# Patient Record
Sex: MALE | Race: WHITE | NOT HISPANIC OR LATINO | URBAN - METROPOLITAN AREA
[De-identification: names, ages, dates, MRNs, and addresses within clinical notes are randomized per-mention and may not be internally consistent; named-entity substitution may affect disease eponyms.]

---

## 2017-08-31 ENCOUNTER — OFFICE VISIT - HEALTHEAST (OUTPATIENT)
Dept: FAMILY MEDICINE | Facility: CLINIC | Age: 42
End: 2017-08-31

## 2017-08-31 DIAGNOSIS — B35.4 RINGWORM OF BODY: ICD-10-CM

## 2017-08-31 DIAGNOSIS — L02.92 BOIL: ICD-10-CM

## 2017-09-06 ENCOUNTER — RECORDS - HEALTHEAST (OUTPATIENT)
Dept: ADMINISTRATIVE | Facility: OTHER | Age: 42
End: 2017-09-06

## 2017-09-19 ENCOUNTER — OFFICE VISIT - HEALTHEAST (OUTPATIENT)
Dept: FAMILY MEDICINE | Facility: CLINIC | Age: 42
End: 2017-09-19

## 2017-09-19 DIAGNOSIS — M25.561 ACUTE PAIN OF RIGHT KNEE: ICD-10-CM

## 2017-09-19 DIAGNOSIS — L02.92 FURUNCLE: ICD-10-CM

## 2017-09-19 ASSESSMENT — MIFFLIN-ST. JEOR: SCORE: 1673.71

## 2018-02-06 ENCOUNTER — OFFICE VISIT - HEALTHEAST (OUTPATIENT)
Dept: FAMILY MEDICINE | Facility: CLINIC | Age: 43
End: 2018-02-06

## 2018-02-06 DIAGNOSIS — L03.012 FELON OF FINGER OF LEFT HAND: ICD-10-CM

## 2018-02-07 ENCOUNTER — OFFICE VISIT - HEALTHEAST (OUTPATIENT)
Dept: FAMILY MEDICINE | Facility: CLINIC | Age: 43
End: 2018-02-07

## 2018-02-07 ENCOUNTER — COMMUNICATION - HEALTHEAST (OUTPATIENT)
Dept: SCHEDULING | Facility: CLINIC | Age: 43
End: 2018-02-07

## 2018-02-07 DIAGNOSIS — R11.0 NAUSEA: ICD-10-CM

## 2018-02-07 ASSESSMENT — MIFFLIN-ST. JEOR: SCORE: 1719.07

## 2018-02-10 LAB — BACTERIA SPEC CULT: ABNORMAL

## 2018-05-17 ENCOUNTER — OFFICE VISIT - HEALTHEAST (OUTPATIENT)
Dept: FAMILY MEDICINE | Facility: CLINIC | Age: 43
End: 2018-05-17

## 2018-05-17 DIAGNOSIS — R59.9 LYMPH NODE ENLARGEMENT: ICD-10-CM

## 2018-05-17 DIAGNOSIS — R03.0 BLOOD PRESSURE ELEVATED WITHOUT HISTORY OF HTN: ICD-10-CM

## 2018-05-17 DIAGNOSIS — R22.1 SUBMENTAL MASS: ICD-10-CM

## 2018-05-17 ASSESSMENT — MIFFLIN-ST. JEOR: SCORE: 1705.46

## 2018-06-06 ENCOUNTER — OFFICE VISIT - HEALTHEAST (OUTPATIENT)
Dept: FAMILY MEDICINE | Facility: CLINIC | Age: 43
End: 2018-06-06

## 2018-06-06 DIAGNOSIS — L02.612 ABSCESS OF TOE OF LEFT FOOT: ICD-10-CM

## 2018-06-06 ASSESSMENT — MIFFLIN-ST. JEOR: SCORE: 1714.53

## 2018-07-30 ENCOUNTER — OFFICE VISIT - HEALTHEAST (OUTPATIENT)
Dept: FAMILY MEDICINE | Facility: CLINIC | Age: 43
End: 2018-07-30

## 2018-07-30 DIAGNOSIS — T23.111A SUPERFICIAL BURN OF RIGHT THUMB, INITIAL ENCOUNTER: ICD-10-CM

## 2018-07-31 ENCOUNTER — RECORDS - HEALTHEAST (OUTPATIENT)
Dept: ADMINISTRATIVE | Facility: OTHER | Age: 43
End: 2018-07-31

## 2018-09-07 ENCOUNTER — OFFICE VISIT - HEALTHEAST (OUTPATIENT)
Dept: FAMILY MEDICINE | Facility: CLINIC | Age: 43
End: 2018-09-07

## 2018-09-07 DIAGNOSIS — J02.0 STREP THROAT: ICD-10-CM

## 2018-09-07 LAB — DEPRECATED S PYO AG THROAT QL EIA: ABNORMAL

## 2018-09-07 ASSESSMENT — MIFFLIN-ST. JEOR: SCORE: 1732.68

## 2018-12-10 ENCOUNTER — OFFICE VISIT - HEALTHEAST (OUTPATIENT)
Dept: FAMILY MEDICINE | Facility: CLINIC | Age: 43
End: 2018-12-10

## 2018-12-10 DIAGNOSIS — J40 BRONCHITIS: ICD-10-CM

## 2018-12-10 DIAGNOSIS — J32.9 SINUSITIS, UNSPECIFIED CHRONICITY, UNSPECIFIED LOCATION: ICD-10-CM

## 2018-12-10 ASSESSMENT — MIFFLIN-ST. JEOR: SCORE: 1719.07

## 2019-02-06 ENCOUNTER — COMMUNICATION - HEALTHEAST (OUTPATIENT)
Dept: FAMILY MEDICINE | Facility: CLINIC | Age: 44
End: 2019-02-06

## 2020-03-23 ENCOUNTER — VIRTUAL VISIT (OUTPATIENT)
Dept: FAMILY MEDICINE | Facility: OTHER | Age: 45
End: 2020-03-23

## 2020-03-24 NOTE — PROGRESS NOTES
"Date: 2020 20:13:27  Clinician: Wanda Medina  Clinician NPI: [id number]  Patient: Jovani Griffin  Patient : 1975  Patient Address: [address]  [place] [address]  Patient Phone: [phone]  Visit Protocol: URI  Patient Summary:  Jovani is a 45 year old ( : 1975 ) male who initiated a Visit for COVID-19 (Coronavirus) evaluation and screening. When asked the question \"Please sign me up to receive news, health information and promotions from [employer].\", Jovani responded \"No\".    Jovani states his symptoms started gradually 3-6 days ago. After his symptoms started, they improved and then got worse again.   His symptoms consist of a sore throat, a cough, and a headache.   Symptom details     Cough: Jovani coughs a few times an hour and his cough is more bothersome at night. Phlegm does not come into his throat when he coughs. He does not believe his cough is caused by post-nasal drip.     Sore throat: Jovani reports having moderate throat pain (4-6 on a 10 point pain scale), has exudate on his tonsils, and can swallow liquids. He is not sure if the lymph nodes in his neck are enlarged. A rash has not appeared on the skin since the sore throat started.     Headache: He states the headache is mild (1-3 on a 10 point pain scale).      Jovani denies having ear pain, rhinitis, facial pain or pressure, myalgias, wheezing, nasal congestion, malaise, chills, teeth pain, and fever. He also denies having a sinus infection within the past year, taking antibiotic medication for the symptoms, and having recent facial or sinus surgery in the past 60 days. He is not experiencing dyspnea.   Precipitating events  Within the past week, Jovani has been exposed to someone with strep throat. He has not recently been exposed to someone with influenza. Jovani has not been in close contact with any high risk individuals.   Pertinent COVID-19 (Coronavirus) information  " Jovani has not traveled internationally or to the areas where COVID-19 (Coronavirus) is widespread, including cruise ship travel in the last 14 days before the start of his symptoms.   Jovani has not had a close contact with a laboratory-confirmed COVID-19 patient within 14 days of symptom onset. He also has not had a close contact with a suspected COVID-19 patient within 14 days of symptom onset.   Jovani is not a healthcare worker and does not work in a healthcare facility.   Pertinent medical history  Jovani does not need a return to work/school note.   Weight: 180 lbs   Jovani smokes or uses smokeless tobacco.   Weight: 180 lbs    MEDICATIONS: No current medications, ALLERGIES: NKDA  Clinician Response:  Dear Jovani,   See below    Diagnosis: Streptococcal pharyngitis  Diagnosis ICD: J02.0  Prescription: azithromycin (Zithromax) 250 mg oral tablet 6 tablet, 5 days supply. Take 2 tablets on day 1 then 1 tablet by mouth 1 time per day for 4 days. Refills: 0, Refill as needed: no, Allow substitutions: yes  Pharmacy: [id number] - [phone] - [address], [place] [address]

## 2021-05-31 VITALS — WEIGHT: 174.2 LBS | BODY MASS INDEX: 25.72 KG/M2

## 2021-05-31 VITALS — BODY MASS INDEX: 26.07 KG/M2 | WEIGHT: 176 LBS | HEIGHT: 69 IN

## 2021-06-01 VITALS — WEIGHT: 189 LBS | BODY MASS INDEX: 27.91 KG/M2

## 2021-06-01 VITALS — WEIGHT: 183 LBS | HEIGHT: 69 IN | BODY MASS INDEX: 27.11 KG/M2

## 2021-06-01 VITALS — BODY MASS INDEX: 27.4 KG/M2 | HEIGHT: 69 IN | WEIGHT: 185 LBS

## 2021-06-01 VITALS — WEIGHT: 183.7 LBS | BODY MASS INDEX: 27.13 KG/M2

## 2021-06-01 VITALS — HEIGHT: 69 IN | BODY MASS INDEX: 27.55 KG/M2 | WEIGHT: 186 LBS

## 2021-06-02 VITALS — HEIGHT: 69 IN | WEIGHT: 186 LBS | BODY MASS INDEX: 27.55 KG/M2

## 2021-06-02 VITALS — BODY MASS INDEX: 27.99 KG/M2 | WEIGHT: 189 LBS | HEIGHT: 69 IN

## 2021-06-03 ENCOUNTER — RECORDS - HEALTHEAST (OUTPATIENT)
Dept: ADMINISTRATIVE | Facility: CLINIC | Age: 46
End: 2021-06-03

## 2021-06-12 NOTE — PROGRESS NOTES
Subjective:      Patient ID: Jovani Griffin is a 42 y.o. male.    Chief Complaint:    HPI  Patient comes in for evaluation of swollen gland on the right side of his neck.  He also has a boil on the left submandibular region that was getting better, but now starting to drain and getting smaller.  He denies a sore throat.  No fevers or chills.  Slight cough.  No rhinitis.  No body aches or arthralgias.      Social History   Substance Use Topics     Smoking status: Current Every Day Smoker     Smokeless tobacco: None     Alcohol use None       Review of Systems    Objective:     /74  Pulse 65  Temp 99  F (37.2  C) (Oral)   Resp 20  Wt 174 lb 3.2 oz (79 kg)  SpO2 99%  BMI 25.72 kg/m2    Physical Exam   Constitutional: No distress.   Well appearing   HENT:   Right Ear: External ear normal.   Left Ear: External ear normal.   Mouth/Throat: Oropharynx is clear and moist.   Eyes: Conjunctivae are normal.   Neck: Neck supple.   Mild swelling and tenderness to the right neck with slight tenderness. Small boil along the left submandibular region.   Pulmonary/Chest: Effort normal.   Neurological: He is alert.   Skin: Skin is warm and dry.   There is a lesion on the right antecubital fossa consistent with ringworm.  He then showed me his chest and back and he had widespread tinea corpus on both sides.       Assessment and Plan:     Procedures    The primary encounter diagnosis was Boil. A diagnosis of Ringworm of body was also pertinent to this visit.     Mild swelling on the right side of the throat that appears to be reactive lymph node.  No evidence of ear infection or sinusitis.  No evidence for a tooth infection.    Boil on the left side of the submandibular region.  This area is nontender no signs of infection.  I am considering treating with antibiotics based on his previous history of boils and culture obtained a couple years ago was sensitive to most of the antibiotics.  I chose Septra for this.    Skin  rash that appears to be due to widespread tinea corpus.  He had been treated in the past with Selsun Blue shampoo for a month and he said that did not do anything for it.  I think he would benefit from consultation with a dermatologist and may need oral antifungal treatment.      Medications Ordered   Medications     sulfamethoxazole-trimethoprim (BACTRIM DS) 800-160 mg per tablet     Sig: Take 1 tablet by mouth 2 (two) times a day for 10 days.     Dispense:  20 tablet     Refill:  0

## 2021-06-13 NOTE — PROGRESS NOTES
Assessment: /    Plan:    1. Acute pain of right knee     2. Furuncle         Discontinue climbing 2 stairs at a time.  Use warm or cold on the knee as needed.  Tylenol as needed for pain.  Recheck if not improving.    Hot packs to the furuncle on the chin.    He will think about quitting smoking.    This was a 32 minute visit with >50% of the time spent in counseling and coordination of care regarding: Knee pain, furuncle.      Subjective:    HPI:  Enrique Griffin is a 42-year-old male presenting with right knee pain.  This began 2 days ago.  Pain is worse when he is kneeling or crouching.  He also notes pain when climbing stairs 2 at a time.  He has not had any injury to the knee.    He also notes an ingrown hair below the chin on the left which has been present for 2 weeks.    Social Hx: He smokes 3/4 pack per day.    Family history: His father, brother and paternal grandmother have diabetes.  His paternal grandfather was diagnosed with colon cancer at age 60.    Review of Systems: No redness of the joints, swelling of the knee, fever, drainage from the chin.      Current Outpatient Prescriptions   Medication Sig Dispense Refill     ketoconazole (NIZORAL) 2 % shampoo   11     sulfamethoxazole-trimethoprim (SEPTRA DS) 800-160 mg per tablet Take 1 tablet by mouth 2 (two) times a day.       fluconazole (DIFLUCAN) 150 MG tablet TK 2 TS NOW THEN 2 TS IN 1 WEEK  0     No current facility-administered medications for this visit.          Objective:    Vitals:    09/19/17 1047   BP: 128/86   Pulse:    Resp:    Temp:        Gen:  NAD, VSS  Lungs:  normal  Heart:  normal  Skin with a furuncle below the chin on the left.  Right knee with tenderness inferior and lateral to the patella.  No erythema, effusion or increased warmth.  Full range of motion, normal strength.  No ligament instability.  Ashley's negative.  Left knee is normal.        ADDITIONAL HISTORY SUMMARIZED (2): None.  DECISION TO OBTAIN EXTRA INFORMATION (1):  None.   RADIOLOGY TESTS (1): None.  LABS (1): None.  MEDICINE TESTS (1): None.  INDEPENDENT REVIEW (2 each): None.     Total Data Points: 0

## 2021-06-15 NOTE — PROGRESS NOTES
Subjective:   Jovani comes in today because of an episode of lightheadedness and weakness.  This was associated with nausea and sweating.  This occurred when he was at his work.  His work sent him home.  Upon further questioning the patient had taken pain medications prior to going to work.  He sustained a burn on his left index finger 1 week ago.  He was given antibiotics and pain medications to help treat this.  The pain medication was oxycodone.  He took 2 tablets this morning on an empty stomach.  His symptoms now have dissipated.  He denies nausea or vomiting at this point.  He had no chest pain.  He denies shortness of breath.      Objective:  HEENT: Conjunctiva clear.  Pharynx clear.  Neck: No bruits heard.  No thyromegaly present.  Cardiac: There is a regular rhythm present.  No ectopy heard.  No murmur present.  Lungs: Clear  Abdomen: Abdomen is flat and soft.  No epigastric tenderness.  No masses noted.  Bowel sounds are active throughout.      Assessment:  1.  Nausea associated with diaphoresis most likely secondary to side effect of narcotic medications.  Rule out hypoglycemic episode      Plan:  I instructed patient he should not be taking narcotic medications while driving or working.  He should not operate machinery on this either.  He was given a note to be off of work today.  He can return to work tomorrow with his present restrictions.  He will stay well hydrated.  He will make sure he is eating routinely throughout the day as well.  I do not feel any blood work or workup for patient's symptoms are indicated at this point in time.  He will follow-up here as needed.

## 2021-06-15 NOTE — PROGRESS NOTES
Chief Complaint   Patient presents with     poss burn     x4days burn on index finger,  ashlee Griffin is a 43 y.o. male seen today for swollen, painful left index finger.  On 2/2/2018 he burned the ulnar surface of the distal phalanx of his left index finger.  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  Occasionally that he penetrates his work gloves.  He states this has happened at least once before.  He was seen at the Workmen's Comp. clinic 3 days after the injury and prescribed oxycodone with Tylenol, cephalexin, and silver sulfadiazine cream.  Today his finger is more swollen and painful, throbbing and keeping him awake at night.  Denies fevers or chills.    Current Outpatient Prescriptions   Medication Sig Dispense Refill     fluconazole (DIFLUCAN) 150 MG tablet TK 2 TS NOW THEN 2 TS IN 1 WEEK  0     ketoconazole (NIZORAL) 2 % shampoo   11     sulfamethoxazole-trimethoprim (BACTRIM DS) 800-160 mg per tablet Take 1 tablet by mouth 2 (two) times a day for 10 days. 20 tablet 0     No current facility-administered medications for this visit.         Reviewed and updated: medical history, medications and allergies.     Review of Systems     General: Denies fever, chills, fatigue.  MSK: Pain in the index finger of his left hand.     Objective     Vitals:    02/06/18 1731   BP: 130/80   Pulse: 87   Resp: 15   Temp: 98  F (36.7  C)   TempSrc: Oral   SpO2: 98%   Weight: 189 lb (85.7 kg)        Reviewed vital signs.  General: Appears calm, comfortable. Answers questions quickly and appropriately with clear speech. No apparent distress.  Skin: Pink, warm, dry.  HENT: Normocephalic, atraumatic.  Neck: Supple.  Heart: Strong, regular radial pulse.  Lungs: Normal respiratory effort.  Neuro: Memory and cognition appear normal. Normal gait.  Psych: Mood and affect appear normal.  MSK: Distal phalanx of left index finger is erythematous, with  fluctuant swelling on the palmar surface.  Burned area is along the ulnar surface of the distal phalanx and is producing a small amount of purulent discharge.  The pad of the finger is exquisitely tender and taut.  Erythema does not extend proximal to the DIP.  Exhibits full AROM at the DIP and PIP.  No areas of necrotic tissue noted.     Medical Decision-Making     Jovani is a well-appearing 43-year-old male who presents with fluctuant, swollen, erythematous pad of the left index finger.  He is right-hand dominant.  While a small amount of purulent discharge can be expressed directly from the burn surface, it appears a significant amount remains within the pad of the finger.  Incision and drainage did produce a small amount of thick, purulent discharge which was cultured for staph.  Directed him to obtain prompt follow-up with a Workmen's Comp. physician no later than Thursday of this week.  If he is not able to see them he is to return to the walk-in clinic.  Given the purulent nature of this infection, I continue the cephalexin and ordered Bactrim instead.    Reviewed red flags that would trigger a prompt return to the clinic as noted below under patient instructions.  He expressed understanding of these directions and is in agreement with the plan.       Procedure     After verifying that sensation and circulation are intact at the tip of the index finger, a digital block was performed just distal to the MCP.  Adequate anesthesia was achieved and verified.  The area was cleansed with iodine swabs.  With an 11 blade and a stabbing motion, an incision was made at the area of maximum fluctuance, producing a small amount of purulent discharge.  A second stabbing incision was made along the ulnar surface close to the burned area, producing a greater amount of discharge.  No further fluctuance is noted.  The discharge was cultured and sent for staph screening.  Procedure was well-tolerated by the patient and he  reported no pain following the digital block.  Dressed with bacitracin ointment and bulky, sterile gauze.     Assessment and Plan     Jovani was seen today for poss burn.    Diagnoses and all orders for this visit:    Felon of finger of left hand  -     sulfamethoxazole-trimethoprim (BACTRIM DS) 800-160 mg per tablet; Take 1 tablet by mouth 2 (two) times a day for 10 days.  -     Culture, Screen for Staph Only      Patient Instructions   Soak your finger in warm water for 15 minutes 4 times per day.    Follow up with you workman's compensation doctor on Thursday. If you cannot see them on Thursday, return to the walk-in clinic.    Please return to the clinic immediately if you notice any of the following:    Fever / chills.    Area of redness is growing or extending up your finger.         Discussed benefit vs risk of medications, dosing, side effects.  Patient was able to verbalize understanding.  After visit summary was provided for patient.     Carroll Heath PA-C

## 2021-06-18 NOTE — PROGRESS NOTES
ASSESSMENT AND PLAN:  1. Lymph node enlargement there is a solitary enlarged slightly tender lymph node present in the left axilla measuring approximately 0.5 cm x 1 cm.  There is no overlying streaking of the skin.  Arm movement does not cause discomfort.  There is no other lymph node enlargement noted in the supraclavicular areas bilaterally or in the right axillary area.  And it was given about signs of lymph node enlargement.    2. Blood pressure elevated without history of HTN blood pressure slightly elevated elevated at last visit patient admits that he did smoke before coming into clinic, he should see his primary doctor for an annual physical there is a family history of heart disease and is unsure of his lipid status    3. Submental mass small solitary 0.1 x 0.2 cm skin mass indicative of a cyst present under his chin is been present for a long duration of times not increasing in size        CHIEF COMPLAINT:  Chief Complaint   Patient presents with     Mass     in L armpit       HISTORY OF PRESENT ILLNESS:  Jovani is a 43 y.o. male presenting for a mass. About 2 days ago, he noticed a 2 bumps in his left axilla . He notes that the bumps are tender to touch. He denies fever and chills. He denies recent shaving of his axilla.  Of note, he has a previous history of multiple boils needing incision and drainage over the past 3 years.      REVIEW OF SYSTEMS:   He denies headache and vomiting.    He also notes a bump is still present over his neck from a previous boil.   All other 10 point review of systems are negative.    PFSH:  . Pertinent past, family, social and medical history reviewed.     TOBACCO USE:  History   Smoking Status     Current Every Day Smoker   Smokeless Tobacco     Current User       VITALS:  Vitals:    05/17/18 0945   BP: (!) 138/96   Patient Site: Right Arm   Patient Position: Sitting   Cuff Size: Adult Regular   Pulse: 84   Resp: 16   Temp: 98.3  F (36.8  C)   TempSrc: Oral   Weight:  "183 lb (83 kg)   Height: 5' 9\" (1.753 m)     Wt Readings from Last 3 Encounters:   05/17/18 183 lb (83 kg)   02/07/18 186 lb (84.4 kg)   02/06/18 189 lb (85.7 kg)     Body mass index is 27.02 kg/(m^2).    PHYSICAL EXAM:  General: Alert, cooperative, no distress, appears stated age  Head: Normocephalic, without obvious abnormality, atraumatic  Lymph: Solitary enlarged left axillary lymph node.   Throat: Lips, mucosa, and tongue normal; teeth and gums normal  Musculoskeletal: Back symmetric, no curvature, ROM normal, no CVA tenderness.  Lungs: Clear to auscultation bilaterally, respirations unlabored  Chest wall: No tenderness or deformity  Heart: Regular rate and rhythm, S1 and S2 normal, no murmur, rub, or gallop  Neurologic: No tremor, no focal findings.      Psych: Oriented x3. Affect normal.     DATA REVIEWED:  Additional History from Old Records Summarized (2): Reviewed Dr. Cameron's note from 8/31/2017 regarding boil.   Decision to Obtain Records (1): None  Radiology Tests Summarized or Ordered (1): None  Labs Reviewed or Ordered (1): None  Medicine Test Summarized or Ordered (1): None  Independent Review of EKG or X-RAY(2 each): None    The visit lasted a total of 14 minutes face to face with the patient. Over 50% of the time was spent counseling and educating the patient about enlarged lymph node.     I, Bev Gomez, am scribing for and in the presence of, Dr. Hanks.    I,tere hanks, personally performed the services described in this documentation, as scribed by Bev Gomez in my presence, and it is both accurate and complete.      MEDICATIONS:  Current Outpatient Prescriptions   Medication Sig Dispense Refill     fluconazole (DIFLUCAN) 150 MG tablet TK 2 TS NOW THEN 2 TS IN 1 WEEK  0     ketoconazole (NIZORAL) 2 % shampoo   11     No current facility-administered medications for this visit.        Total Data Points: 2    "

## 2021-06-18 NOTE — PROGRESS NOTES
Subjective:   Jovani comes in today because of pain in his left fourth toe.  This started about 2 or 3 days ago.  He has noticed a swelling in the base of the toe as well.  He does not remember any injury.  He has no fevers.  There has been no drainage coming from the area.  He does state he will get skin infections between his toes occasionally that are consistent with fungal infections.      Objective:  Musculoskeletal: The fourth toe of the left foot is evaluated.  There is swelling noted at the base of the toe.  There is a small abscess present.  This was starting to come to ahead.  Using a #11 blade I unroofed the abscess and a small amount of purulent drainage was revealed.  The area was very tender to palpation.  No streaking of redness noted coming up the foot.  Passive movement of the fourth toe exacerbated pain as well.      Assessment:  1.  Abscess left fourth toe.  Rule out foreign body      Plan:  The patient will start Augmentin 875 mg twice daily.  He will take this with food.  He will soak the foot as much as possible over the next 1-2 days.  Keep a Band-Aid dressing over this while working.  Use Tylenol for pain control.  Follow-up here only if symptoms would worsen.

## 2021-06-20 NOTE — PROGRESS NOTES
"  Chief Complaint   Patient presents with     Sore Throat     Can see white spots on back of throat, couple days          HPI:   Jovani Griffin is a 43 y.o. male sore throat for two days.  Feels warm.  Took nyquil, cough drops.  Bilateral ear pain.  No stuffy nose.  Cough.  No shortness of breath.  Smokes.1 ppd.    Son was sick with sore throat but not strep.    ROS:  A 10 point comprehensive review of systems was negative except as noted.     Medications:  Current Outpatient Prescriptions on File Prior to Visit   Medication Sig Dispense Refill     fluconazole (DIFLUCAN) 150 MG tablet TK 2 TS NOW THEN 2 TS IN 1 WEEK  0     ketoconazole (NIZORAL) 2 % shampoo   11     No current facility-administered medications on file prior to visit.          Social History:  Social History   Substance Use Topics     Smoking status: Current Every Day Smoker     Smokeless tobacco: Current User     Alcohol use Not on file         Physical Exam:   Vitals:    09/07/18 0911   BP: 124/80   Pulse: 84   Resp: 16   Temp: 98.6  F (37  C)   TempSrc: Oral   SpO2: 95%   Weight: 189 lb (85.7 kg)   Height: 5' 9\" (1.753 m)       GENERAL:   Alert. Oriented.  EYES: Clear  HENT:  Ears: R TM pearly gray. Normal landmarks. L TM pearly gray.  Normal landmarks  Nose: Clear.  Sinuses: Nontender.  Oropharynx:  Moderate erythema. Few spots of exudate.  NECK: Supple. No adenopathy.  LUNGS: Clear to ascultation.  No crackles.  No wheezing  HEART: RRR  SKIN:  No rash.     LABS:  Results for orders placed or performed in visit on 09/07/18   Rapid Strep A Screen- Throat Swab   Result Value Ref Range    Rapid Strep A Antigen Group A Strep detected (!) No Group A Strep detected, presumptive negative        Assessment/Plan:    1. Strep throat  Rapid Strep A Screen- Throat Swab    amoxicillin (AMOXIL) 500 MG tablet    Antibiotic as directed.  Throat sprays or lozenges as needed.  Tylenol or Ibuprofen as needed.  Good fluid intake.  F/U if worsening or not " improving.     Advised to stopped smoking.  Advised quit plan web site.          Mecca Muñoz MD      9/7/2018    The following portions of the patient's history were reviewed and updated as appropriate: allergies, current medications, past family history, past medical history, past social history, past surgical history and problem list.

## 2021-06-22 NOTE — PROGRESS NOTES
"SUBJECTIVE  Jovani Griffin is a 43 y.o. male here for     Chief Complaint   Patient presents with     Cough     x 2wks (productive)     Nasal Congestion     Started 2 weeks with cold symptoms and tickle in throat. He had some nasal congestion and cough and was taking OTC nyquil, robitussin. Now with harsh, productive cough and greenish sputum. Occasional post-tussive emesis when he has coughing fit. Having lots more nasal drainage, greenish. Smoker- precontemplative about quitting. No wheezing. No facial pain or fevers.  No known history of lung disease.    ROS  Complete 10 point review of systems negative except as noted above in HPI    Reviewed Past Medical History, Medications, Family History and Social History in Epic and up to date with no new changes.    OBJECTIVE  /84 (Patient Site: Left Arm, Patient Position: Sitting, Cuff Size: Adult Regular)   Pulse 68   Temp 98.4  F (36.9  C) (Oral)   Resp 16   Ht 5' 9\" (1.753 m)   Wt 186 lb (84.4 kg)   SpO2 98%   BMI 27.47 kg/m       General: Cooperative, pleasant, in no acute distress  HEENT: NCAT, sclera clear, nasal mucosa erythematous and boggy, MMM.  Neck: cervical lymphadenopathy, no masses  CV: RRR, normal S1/S2, no murmur, rubs, gallops  Resp: No respiratory distress. Clear to auscultation bilaterally. No wheezes, rales, rhonchi    ASSESSMENT/PLAN:   Jovani was seen today for cough and nasal congestion.    Diagnoses and all orders for this visit:    Bronchitis: Recent history of symptoms consistent with viral URI and now with worsening cough and increase in sputum. Afebrile and vitally stable. Lungs clear with no wheezing. Tobacco use- discussed smoking cessation today and he is precontemplative about quitting. Considering his cough is severe and not responding to conservative treatments and worsening over the last few days with increase in sputum, will treat with antibiotics. Also discussed post-viral cough and encouraged continuing " supportive cares and using humidifier at night.  -     azithromycin (ZITHROMAX Z-NOEMY) 250 MG tablet; Take 2 tablets (500 mg) on  Day 1,  followed by 1 tablet (250 mg) once daily on Days 2 through 5..      Options for treatment and follow-up care were reviewed with the patient. Jovani Griffin and/or guardian was engaged and actively involved in the decision making process. Jovani Griffin and/or guardian verbalized understanding of the options discussed and was satisfied with the final plan.      Maryse Culver MD

## 2021-06-23 NOTE — TELEPHONE ENCOUNTER
The patient is due to establish care with a new primary care provider as Dr. Javid Weaver is no longer practicing at the [facility]. CMT attempting to reach the patient to schedule him/her with a new PCP at [facility].

## 2021-06-23 NOTE — TELEPHONE ENCOUNTER
EVA spoke with the patient. Patient is seeing a provider at [facility] and will not be establishing care here at [facility].

## 2021-06-26 NOTE — PROGRESS NOTES
"Progress Notes by Javid Rubio PA-C at 7/30/2018  7:20 AM     Author: Javid Rubio PA-C Service: -- Author Type: Physician Assistant    Filed: 7/30/2018  7:52 AM Encounter Date: 7/30/2018 Status: Signed    : Javid Rubio PA-C (Physician Assistant)       Subjective:      Patient ID: Jovani Griffin is a 43 y.o. male.    Chief Complaint:    HPI  Jovani Griffin is a 43 y.o. male who presents today complaining of a work comp related injury sustained to the right thumb on the finger pad.  Patient works at [employer] in [place].  Date of injury is 7/25/2018.  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  He does work with personal protective equipment with gloves to help prevent burning from the heat.  He thinks that maybe the gloves have been worn out or within because he sustained a burn to the distal finger pad of the right thumb.  Subsequently he developed pain and blistering on Saturday.  Has not tried any treatment for this at home other than a light coating of topical antibiotic with some relief.  He states that his tetanus is up-to-date with the \"work comp office\" for previous injury earlier in the year.     He has no other involvement anywhere else on the hand or contralateral left hand.  Other complaints to address in the office.      No past medical history on file.    No past surgical history on file.    Family History   Problem Relation Age of Onset   ? Diabetes Father    ? Diabetes Brother    ? Diabetes Paternal Grandmother    ? Colon cancer Paternal Grandfather        Social History   Substance Use Topics   ? Smoking status: Current Every Day Smoker   ? Smokeless tobacco: Current User   ? Alcohol use None       Review of Systems  As above in HPI otherwise negative.  Objective:     /84 (Patient Site: Left Arm, Patient Position: Sitting)  Pulse 76  Temp 97.7  F (36.5  C) (Oral)   Resp 20  Wt 183 lb 11.2 oz (83.3 kg)  SpO2 99% "  BMI 27.13 kg/m2    Physical Exam  General: She is resting company no acute distress.  Musculoskeletal: Focused examination right hand shows on the right finger pad there is a small 1.5 cm x 1 cm area of erythema and blistering appears to be consistent with the burn.  There is no foreign body.  There is no necrosis there is no sloughing of the skin.  Mildly tender to palpation but no exudate.  No pain on the underlying bony surface.  Assessment:     Procedures    The encounter diagnosis was Superficial burn of right thumb, initial encounter.    Plan:     1. Superficial burn of right thumb, initial encounter  cephalexin (KEFLEX) 500 MG capsule       Had a conversation with the patient.  I recommended that he contact his human resource department felt the appropriate paperwork.  I would have him continue to use his personal protective equipment and avoid heat and keep the wound clean dry and protected.  He have close follow-up with his primary care provider for reevaluation of his wound and check his healing status.  At that time the work related paperwork can also be filled out as necessary.  Shins were answered patient's satisfaction before discharge    Patient Instructions     Patient states that he has had his tetanus immunization up-to-date the Worker's Comp. clinic.  Was apparently for previous injury.  Keep the area clean dry and protected.  Patient has Silvadene cream but he is given Adaptic and Coban and 4 x 4's to cover the wound to keep it clean dry and protected  Avoid further heat exposure or injury.  To need to wear your personal protective equipment at work and inspected to make sure that is functioning.  follow up with your human resource person to fill out any appropriate work comp related injury.  Follow-up with your primary care provider for long-term follow-up of this injury and work comp related paperwork.    As a result of our visit today, here are the action plans for you:    1. Medication(s)  to stop: There are no discontinued medications.    2. Medication(s) to start or change:   Medications Ordered   Medications   ? cephalexin (KEFLEX) 500 MG capsule     Sig: Take 1 capsule (500 mg total) by mouth 4 (four) times a day for 5 days.     Dispense:  20 capsule     Refill:  0       3. Other instructions: Yes         First-Degree Burn  A burn occurs when skin is exposed to too much heat, sun, or harsh chemicals. A first-degree burn (superficial burn) causes mainly redness. It heals in a few days.  Home care  Follow these guidelines when caring for yourself at home:    Use pain medicine as directed. You may use over-the-counter medicine to control pain if no pain medicine was prescribed. If you have chronic liver or kidney disease, talk with your healthcare provider before using acetaminophen or ibuprofen. Also talk with your provider if you've had a stomach ulcer or GI bleeding.    On the first day, you may put a cool compress on the burn to relieve pain. You can use a small towel soaked in cool water as a cool compress.    Numbing medicines for sunburn can be used for temporary relief of the burning feeling. These medicines include lidocaine or benzocaine. You dont need a prescription for them.    Moisturizers with aloe vera can help soothe the burn.    Don't pick or scratch at the affected areas. Use over-the-counter medicines like diphenhydramine for itching. This medicine is taken by mouth.    Since you don't have an open wound, you don't need to use antibiotic cream or ointment. Sometimes an infection may occur even with proper treatment. Be sure to check the burn daily for the signs of infection listed below.    Wear a hat, sunscreen, and long sleeves while in the sun.    Wear loose-fitting clothing until the burn heals.  Follow-up care  Follow up with your healthcare provider, or as advised. Most first-degree burns heal well without complications.  When to seek medical advice  Call your healthcare  provider right away if any of these signs of infection occur:    Fever of 100.4 F (38 C) or higher, or as directed by your healthcare provider    Pain gets worse    Redness or swelling gets worse    Pus comes from the burn    Red streaks in your skin come from the burn    Wound doesn't appear to be healing  Date Last Reviewed: 12/1/2016 2000-2017 [employer]. [address] [place] [address]. All rights reserved. This information is not intended as a substitute for professional medical care. Always follow your healthcare professional's instructions.